# Patient Record
Sex: MALE | Race: BLACK OR AFRICAN AMERICAN | NOT HISPANIC OR LATINO | Employment: FULL TIME | URBAN - METROPOLITAN AREA
[De-identification: names, ages, dates, MRNs, and addresses within clinical notes are randomized per-mention and may not be internally consistent; named-entity substitution may affect disease eponyms.]

---

## 2019-11-17 ENCOUNTER — APPOINTMENT (OUTPATIENT)
Dept: CT IMAGING | Facility: HOSPITAL | Age: 49
End: 2019-11-17

## 2019-11-17 ENCOUNTER — HOSPITAL ENCOUNTER (EMERGENCY)
Facility: HOSPITAL | Age: 49
Discharge: HOME | End: 2019-11-17
Attending: EMERGENCY MEDICINE | Admitting: EMERGENCY MEDICINE

## 2019-11-17 VITALS
DIASTOLIC BLOOD PRESSURE: 86 MMHG | SYSTOLIC BLOOD PRESSURE: 148 MMHG | BODY MASS INDEX: 33.53 KG/M2 | HEART RATE: 80 BPM | RESPIRATION RATE: 16 BRPM | OXYGEN SATURATION: 99 % | TEMPERATURE: 98.6 F | HEIGHT: 76 IN | WEIGHT: 275.35 LBS

## 2019-11-17 DIAGNOSIS — M10.9 ACUTE GOUT OF RIGHT HAND, UNSPECIFIED CAUSE: ICD-10-CM

## 2019-11-17 DIAGNOSIS — R10.9 RIGHT FLANK PAIN: Primary | ICD-10-CM

## 2019-11-17 LAB
BILIRUB UR QL STRIP: NEGATIVE
CLARITY UR: CLEAR
COLOR UR: YELLOW
GLUCOSE UR STRIP-MCNC: NEGATIVE MG/DL
HGB UR QL STRIP.AUTO: NEGATIVE
KETONES UR QL STRIP: NEGATIVE
LEUKOCYTE ESTERASE UR QL STRIP.AUTO: NEGATIVE
NITRITE UR QL STRIP: NEGATIVE
PH UR STRIP.AUTO: 6 [PH] (ref 5–8)
PROT UR QL STRIP: NEGATIVE
SP GR UR STRIP: <=1.005 (ref 1–1.03)
UROBILINOGEN UR QL STRIP: NORMAL

## 2019-11-17 PROCEDURE — 74176 CT ABD & PELVIS W/O CONTRAST: CPT

## 2019-11-17 PROCEDURE — 81003 URINALYSIS AUTO W/O SCOPE: CPT | Performed by: EMERGENCY MEDICINE

## 2019-11-17 PROCEDURE — 99283 EMERGENCY DEPT VISIT LOW MDM: CPT

## 2019-11-17 RX ORDER — NAPROXEN SODIUM 220 MG
220 TABLET ORAL 2 TIMES DAILY PRN
COMMUNITY
End: 2020-12-07

## 2019-11-17 RX ORDER — PREDNISONE 20 MG/1
60 TABLET ORAL DAILY
Qty: 15 TABLET | Refills: 0 | Status: SHIPPED | OUTPATIENT
Start: 2019-11-17 | End: 2019-11-22

## 2019-11-17 NOTE — DISCHARGE INSTRUCTIONS
Follow-up with your primary doctor.  Return to the emergency room for any new or worsening symptoms or if you have any other questions or concerns.  Take medication as prescribed.

## 2019-11-17 NOTE — ED PROVIDER NOTES
Subjective   48-year-old male presents with right flank pain.  Patient states the pain has been intermittent over the past week.  Pain is described as moderate in intensity without radiation.  Patient states pain is worse with certain movements.  He denies any dysuria or hematuria.  He states he has had kidney stones in the past and this feels similar.  He denies any nausea or vomiting.  He also states he has a history of multiple gout flares.  He states he is having a gout flare in his right hand which has been present for a few days.        History provided by:  Patient      Review of Systems   Constitutional: Negative for fatigue and fever.   HENT: Negative for congestion and sore throat.    Eyes: Negative for pain and redness.   Respiratory: Negative for cough and shortness of breath.    Cardiovascular: Negative for chest pain and palpitations.   Gastrointestinal: Negative for abdominal pain, nausea and vomiting.   Genitourinary: Positive for flank pain. Negative for dysuria.   Musculoskeletal: Negative for neck pain.   Skin: Negative for rash.   Neurological: Negative for dizziness and headaches.   Psychiatric/Behavioral: Negative for behavioral problems and confusion.       Past Medical History:   Diagnosis Date   • Arthritis    • Gout    • Hypertension    • Kidney stone        No Known Allergies    Past Surgical History:   Procedure Laterality Date   • MEDIAL COLLATERAL LIGAMENT REPAIR, KNEE Left    • WRIST SURGERY Right        History reviewed. No pertinent family history.    Social History     Socioeconomic History   • Marital status:      Spouse name: Not on file   • Number of children: Not on file   • Years of education: Not on file   • Highest education level: Not on file   Tobacco Use   • Smoking status: Never Smoker   • Smokeless tobacco: Never Used   Substance and Sexual Activity   • Alcohol use: Yes     Alcohol/week: 12.6 oz     Types: 21 Cans of beer per week   • Drug use: No   • Sexual  "activity: Defer       BP (!) 174/107 (BP Location: Left arm, Patient Position: Sitting)   Pulse 75   Temp 98.1 °F (36.7 °C) (Oral)   Resp 16   Ht 193 cm (76\")   Wt 125 kg (275 lb 5.7 oz)   SpO2 99%   BMI 33.52 kg/m²       Objective   Physical Exam   Constitutional: He is oriented to person, place, and time. He appears well-developed and well-nourished.   HENT:   Head: Normocephalic and atraumatic.   Eyes: EOM are normal. Pupils are equal, round, and reactive to light.   Neck: Normal range of motion. Neck supple.   Cardiovascular: Normal rate, regular rhythm and normal heart sounds.   Pulmonary/Chest: Effort normal and breath sounds normal. No respiratory distress.   Abdominal: Soft. Bowel sounds are normal. There is no tenderness. There is no CVA tenderness.   Musculoskeletal:   There is some swelling and tenderness to the right hand mostly overlying the fourth and fifth MCP joints   Neurological: He is alert and oriented to person, place, and time.   Skin: Skin is warm and dry.   Nursing note and vitals reviewed.      Procedures           ED Course      Results for orders placed or performed during the hospital encounter of 11/17/19   Urinalysis With Culture If Indicated - Urine, Clean Catch   Result Value Ref Range    Color, UA Yellow Yellow, Straw    Appearance, UA Clear Clear    pH, UA 6.0 5.0 - 8.0    Specific Gravity, UA <=1.005 1.005 - 1.030    Glucose, UA Negative Negative    Ketones, UA Negative Negative    Bilirubin, UA Negative Negative    Blood, UA Negative Negative    Protein, UA Negative Negative    Leuk Esterase, UA Negative Negative    Nitrite, UA Negative Negative    Urobilinogen, UA 0.2 E.U./dL 0.2 - 1.0 E.U./dL     Ct Abdomen Pelvis Without Contrast    Result Date: 11/17/2019  No acute findings are seen. No renal stones are identified. No hydronephrosis. No obstructive uropathy is suggested.  Electronically Signed By-Dr. Douglas Pablo MD On:11/17/2019 2:41 PM This report was finalized on " [id number] by Dr. Douglas Pablo MD.                MDM   CT of the abdomen pelvis showed no acute abnormality.  Patient's right flank pain is likely due to a muscle strain.  Patient also has a gout flare in his right hand.  He states he has had this many times and it always improves with prednisone.  He will be given a prescription for prednisone and he is to follow-up with his primary doctor.      Final diagnoses:   Right flank pain   Acute gout of right hand, unspecified cause              Robb Lindsay MD  11/17/19 2725

## 2020-03-10 ENCOUNTER — APPOINTMENT (OUTPATIENT)
Dept: GENERAL RADIOLOGY | Facility: HOSPITAL | Age: 50
End: 2020-03-10

## 2020-03-10 ENCOUNTER — APPOINTMENT (OUTPATIENT)
Dept: MRI IMAGING | Facility: HOSPITAL | Age: 50
End: 2020-03-10

## 2020-03-10 ENCOUNTER — HOSPITAL ENCOUNTER (EMERGENCY)
Facility: HOSPITAL | Age: 50
Discharge: HOME | End: 2020-03-10
Attending: EMERGENCY MEDICINE | Admitting: EMERGENCY MEDICINE

## 2020-03-10 VITALS
TEMPERATURE: 97.5 F | HEIGHT: 76 IN | OXYGEN SATURATION: 100 % | SYSTOLIC BLOOD PRESSURE: 172 MMHG | WEIGHT: 266.98 LBS | DIASTOLIC BLOOD PRESSURE: 110 MMHG | BODY MASS INDEX: 32.51 KG/M2 | HEART RATE: 70 BPM | RESPIRATION RATE: 16 BRPM

## 2020-03-10 DIAGNOSIS — M54.41 ACUTE BILATERAL LOW BACK PAIN WITH RIGHT-SIDED SCIATICA: ICD-10-CM

## 2020-03-10 DIAGNOSIS — M51.36 LUMBAR DEGENERATIVE DISC DISEASE: Primary | ICD-10-CM

## 2020-03-10 PROCEDURE — 25010000002 HYDROMORPHONE PER 4 MG: Performed by: EMERGENCY MEDICINE

## 2020-03-10 PROCEDURE — 96372 THER/PROPH/DIAG INJ SC/IM: CPT

## 2020-03-10 PROCEDURE — 72148 MRI LUMBAR SPINE W/O DYE: CPT

## 2020-03-10 PROCEDURE — 63710000001 PREDNISONE PER 5 MG: Performed by: EMERGENCY MEDICINE

## 2020-03-10 PROCEDURE — 73502 X-RAY EXAM HIP UNI 2-3 VIEWS: CPT

## 2020-03-10 PROCEDURE — 99283 EMERGENCY DEPT VISIT LOW MDM: CPT

## 2020-03-10 PROCEDURE — 25010000002 ONDANSETRON PER 1 MG: Performed by: EMERGENCY MEDICINE

## 2020-03-10 PROCEDURE — 96374 THER/PROPH/DIAG INJ IV PUSH: CPT

## 2020-03-10 RX ORDER — CLONIDINE HYDROCHLORIDE 0.1 MG/1
0.1 TABLET ORAL DAILY
COMMUNITY

## 2020-03-10 RX ORDER — HYDROCODONE BITARTRATE AND ACETAMINOPHEN 7.5; 325 MG/1; MG/1
1 TABLET ORAL EVERY 6 HOURS PRN
Qty: 12 TABLET | Refills: 0 | Status: SHIPPED | OUTPATIENT
Start: 2020-03-10

## 2020-03-10 RX ORDER — TIZANIDINE HYDROCHLORIDE 4 MG/1
4 CAPSULE, GELATIN COATED ORAL 3 TIMES DAILY PRN
Qty: 12 CAPSULE | Refills: 0 | Status: SHIPPED | OUTPATIENT
Start: 2020-03-10 | End: 2020-12-07

## 2020-03-10 RX ORDER — METHYLPREDNISOLONE 4 MG/1
TABLET ORAL
Qty: 1 EACH | Refills: 0 | Status: SHIPPED | OUTPATIENT
Start: 2020-03-10 | End: 2020-12-16

## 2020-03-10 RX ORDER — ONDANSETRON 2 MG/ML
4 INJECTION INTRAMUSCULAR; INTRAVENOUS ONCE
Status: COMPLETED | OUTPATIENT
Start: 2020-03-10 | End: 2020-03-10

## 2020-03-10 RX ORDER — HYDROMORPHONE HCL 110MG/55ML
1 PATIENT CONTROLLED ANALGESIA SYRINGE INTRAVENOUS ONCE
Status: COMPLETED | OUTPATIENT
Start: 2020-03-10 | End: 2020-03-10

## 2020-03-10 RX ADMIN — ONDANSETRON 4 MG: 2 INJECTION INTRAMUSCULAR; INTRAVENOUS at 14:16

## 2020-03-10 RX ADMIN — HYDROMORPHONE HYDROCHLORIDE 1 MG: 2 INJECTION, SOLUTION INTRAMUSCULAR; INTRAVENOUS; SUBCUTANEOUS at 14:16

## 2020-03-10 RX ADMIN — PREDNISONE 60 MG: 10 TABLET ORAL at 14:16

## 2020-03-10 NOTE — ED PROVIDER NOTES
Subjective   49-year-old male complaining of sciatic type pain as well as severe pain in his lower back.  He reports no change in bowel or bladder habits.  He reports no acute trauma to the area.  He states that he has tried anti-inflammatory measures and activity restriction at home and has failed to improve.  He reports that he has numbness and pain rating down the posterior lateral aspect of his leg for at least a week.  He states that he had actually missed a couple of steps due to the amount of pain.  He denies dysuria or hematuria          Review of Systems    Past Medical History:   Diagnosis Date   • Arthritis    • Gout    • Hypertension    • Kidney stone        No Known Allergies    Past Surgical History:   Procedure Laterality Date   • MEDIAL COLLATERAL LIGAMENT REPAIR, KNEE Left    • WRIST SURGERY Right        No family history on file.    Social History     Socioeconomic History   • Marital status:      Spouse name: Not on file   • Number of children: Not on file   • Years of education: Not on file   • Highest education level: Not on file   Tobacco Use   • Smoking status: Never Smoker   • Smokeless tobacco: Never Used   Substance and Sexual Activity   • Alcohol use: Yes     Alcohol/week: 21.0 standard drinks     Types: 21 Cans of beer per week   • Drug use: No   • Sexual activity: Defer           Objective   Physical Exam  Alert [place] Coma Scale 15   HEENT: Pupils equal and reactive to light. Conjunctivae are not injected. normal tympanic membranes. Oropharynx and nares are normal.   Neck: Supple. Midline trachea. No JVD. No goiter.   Chest: Clear and equal breath sounds bilaterally regular rate and rhythm without murmur or rub.   Abdomen: Positive bowel sounds nontender nondistended. No rebound or peritoneal signs. No CVA tenderness.   Extremities no clubbing cyanosis or edema motor sensory exam is normal the full range of motion is intact  Back: There is extensive lumbar paraspinal tenderness.   There is right-sided SI joint area discomfort.  There is a positive straight leg raising test on the right   skin: Warm and dry, no rashes or petechia.   Lymphatic: No regional lymphadenopathy. No calf pain, swelling or Nereida's sign    Procedures           ED Course         Extended ER course secondary to delay in MR      Labs Reviewed - No data to display  Medications   predniSONE (DELTASONE) tablet 60 mg (60 mg Oral Given 3/10/20 1416)   HYDROmorphone (DILAUDID) injection 1 mg (1 mg Intramuscular Given 3/10/20 1416)   ondansetron (ZOFRAN) injection 4 mg (4 mg Intravenous Given 3/10/20 1416)     No radiology results for the last day                               MDM  Number of Diagnoses or Management Options     Amount and/or Complexity of Data Reviewed  Tests in the radiology section of CPT®: reviewed  Review and summarize past medical records: yes  Independent visualization of images, tracings, or specimens: yes    Risk of Complications, Morbidity, and/or Mortality  Presenting problems: high  Diagnostic procedures: high  Management options: high  General comments: Inspect was reviewed.  The patient was given a prescription for hydrocodone and Medrol Dosepak.  The patient will also have a prescription for tizanidine.  The patient will be referred to the [facility] spine center.  The patient was stable at discharge and vocalized understanding of discharge instructions and warning        Final diagnoses:   Lumbar degenerative disc disease   Acute bilateral low back pain with right-sided sciatica            Karlo Lofton MD  03/10/20 1748       Karlo Lofton MD  03/14/20 7524

## 2020-03-10 NOTE — DISCHARGE INSTRUCTIONS
Medication as directed  Rest, no work, next 24 hours  No heavy lifting bending stooping or pulling for the next 5 days  You can also use Tylenol or ibuprofen for discomfort  Follow-up with spine clinic

## 2020-03-11 ENCOUNTER — TELEPHONE (OUTPATIENT)
Dept: NEUROSURGERY | Facility: CLINIC | Age: 50
End: 2020-03-11

## 2020-03-11 NOTE — TELEPHONE ENCOUNTER
PATIENT WAS CALLING TO SCHEDULE FOR A FOLLOW UP FROM BEING IN THE EMERGENCY ROOM YESTERDAY. PER THE NOTES FROM ED HE WILL BE REFERRED TO THE SPINE CENTER. PLEASE SEE IF PATIENTS CHART NEEDS TO BE REVIEWED TO GET HIM SCHEDULED.     PATIENT CAN BE REACHED ANYTIME [phone]

## 2020-03-24 DIAGNOSIS — M47.27 SPONDYLOSIS OF LUMBOSACRAL SPINE WITH RADICULOPATHY: Primary | ICD-10-CM

## 2020-03-24 RX ORDER — GABAPENTIN 300 MG/1
300 CAPSULE ORAL NIGHTLY
Qty: 120 CAPSULE | Refills: 2 | Status: SHIPPED | OUTPATIENT
Start: 2020-03-24

## 2020-03-25 ENCOUNTER — TELEPHONE (OUTPATIENT)
Dept: NEUROSURGERY | Facility: CLINIC | Age: 50
End: 2020-03-25

## 2020-03-25 NOTE — TELEPHONE ENCOUNTER
----- Message from Thony Pickard MD sent at 3/24/2020  4:46 PM EDT -----  Regarding: Plan for Mr Luis  I spoke with the patient.  His leg pain is getting some better and he has spinal stenosis and neurogenic claudication.  I sent him in [employer] and gave him the web link to [employer] to the back exercise page.  I sent a referral to [facility] and if they need a note let them know to use the ER note and my documentation. He has severe degenerative disease and stenosis at the L3-S1 with L4-5 being the worse.  There is a right acute disc at L4-5.  Send them the MRI report.  I will see him in one month.  If the pain worsens he needs to get in sooner.

## 2020-12-07 ENCOUNTER — HOSPITAL ENCOUNTER (EMERGENCY)
Facility: HOSPITAL | Age: 50
Discharge: HOME | End: 2020-12-07
Admitting: EMERGENCY MEDICINE

## 2020-12-07 ENCOUNTER — APPOINTMENT (OUTPATIENT)
Dept: GENERAL RADIOLOGY | Facility: HOSPITAL | Age: 50
End: 2020-12-07

## 2020-12-07 VITALS
OXYGEN SATURATION: 97 % | SYSTOLIC BLOOD PRESSURE: 140 MMHG | HEIGHT: 76 IN | HEART RATE: 74 BPM | BODY MASS INDEX: 33.26 KG/M2 | WEIGHT: 273.15 LBS | RESPIRATION RATE: 16 BRPM | DIASTOLIC BLOOD PRESSURE: 70 MMHG | TEMPERATURE: 98 F

## 2020-12-07 DIAGNOSIS — M25.512 ACUTE PAIN OF LEFT SHOULDER: Primary | ICD-10-CM

## 2020-12-07 PROCEDURE — 73030 X-RAY EXAM OF SHOULDER: CPT

## 2020-12-07 PROCEDURE — 99283 EMERGENCY DEPT VISIT LOW MDM: CPT

## 2020-12-07 PROCEDURE — 96372 THER/PROPH/DIAG INJ SC/IM: CPT

## 2020-12-07 PROCEDURE — 25010000002 KETOROLAC TROMETHAMINE PER 15 MG: Performed by: NURSE PRACTITIONER

## 2020-12-07 RX ORDER — CYCLOBENZAPRINE HCL 10 MG
10 TABLET ORAL 3 TIMES DAILY PRN
Qty: 10 TABLET | Refills: 0 | Status: SHIPPED | OUTPATIENT
Start: 2020-12-07 | End: 2021-01-23

## 2020-12-07 RX ORDER — KETOROLAC TROMETHAMINE 30 MG/ML
30 INJECTION, SOLUTION INTRAMUSCULAR; INTRAVENOUS ONCE
Status: COMPLETED | OUTPATIENT
Start: 2020-12-07 | End: 2020-12-07

## 2020-12-07 RX ORDER — METHOCARBAMOL 500 MG/1
500 TABLET, FILM COATED ORAL 4 TIMES DAILY
COMMUNITY

## 2020-12-07 RX ORDER — CYCLOBENZAPRINE HCL 10 MG
10 TABLET ORAL 3 TIMES DAILY PRN
Qty: 10 TABLET | Refills: 0 | Status: SHIPPED | OUTPATIENT
Start: 2020-12-07 | End: 2020-12-07 | Stop reason: SDUPTHER

## 2020-12-07 RX ADMIN — KETOROLAC TROMETHAMINE 30 MG: 30 INJECTION, SOLUTION INTRAMUSCULAR at 15:52

## 2020-12-07 NOTE — ED PROVIDER NOTES
Subjective   Patient presents with:  Arm Pain    Onset: 3 weeks  Provocative Factors: Worsened with movement of the left arm  Palliative Factors: Improved with rest in certain positions  Quality: Aching  Region: Left shoulder  Severity: Moderate  Timing: Waxes and wanes    Patient is a 50-year-old who presents emergency department with complaint of left shoulder pain for 3 weeks.  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  Patient also reports that he has a history of an injury to the shoulder several years ago, with a fracture.  He denies any new injury or trauma to the area. No Recent falls.  Denies any chest pain or shortness of breath.          Review of Systems   Constitutional: Negative for chills and fever.   Respiratory: Negative for cough, chest tightness and shortness of breath.    Cardiovascular: Negative for chest pain, palpitations and leg swelling.   Musculoskeletal: Negative for back pain and neck pain.        Left shoulder pain   Skin: Negative for rash.       Past Medical History:   Diagnosis Date   • Arthritis    • Gout    • Hypertension    • Kidney stone        No Known Allergies    Past Surgical History:   Procedure Laterality Date   • MEDIAL COLLATERAL LIGAMENT REPAIR, KNEE Left    • WRIST SURGERY Right        History reviewed. No pertinent family history.     Social History     Socioeconomic History   • Marital status:      Spouse name: Not on file   • Number of children: Not on file   • Years of education: Not on file   • Highest education level: Not on file   Tobacco Use   • Smoking status: Never Smoker   • Smokeless tobacco: Never Used   Substance and Sexual Activity   • Alcohol use: Yes     Alcohol/week: 21.0 standard drinks     Types: 21 Cans of beer per week   • Drug use: No   • Sexual activity: Defer           Objective   Physical Exam  Vitals signs and nursing note reviewed.   Constitutional:       Appearance: Normal appearance.   HENT:       "Head: Normocephalic and atraumatic.      Nose: Nose normal.      Mouth/Throat:      Mouth: Mucous membranes are moist.      Pharynx: Oropharynx is clear.   Eyes:      Extraocular Movements: Extraocular movements intact.      Conjunctiva/sclera: Conjunctivae normal.      Pupils: Pupils are equal, round, and reactive to light.   Neck:      Musculoskeletal: Normal range of motion and neck supple.   Cardiovascular:      Rate and Rhythm: Normal rate and regular rhythm.      Heart sounds: Normal heart sounds. No murmur. No friction rub. No gallop.    Pulmonary:      Effort: Pulmonary effort is normal.      Breath sounds: Normal breath sounds.   Abdominal:      General: Bowel sounds are normal.      Palpations: Abdomen is soft.   Musculoskeletal:      Left shoulder: He exhibits tenderness and pain. He exhibits normal range of motion, no bony tenderness, no swelling, no effusion, no crepitus, no deformity, no laceration, no spasm, normal pulse and normal strength.      Comments: Pain with range of motion of the left shoulder with abduction, extension, reproducing the patient's subjective complaint of pain.  Some mild tenderness noted to the anterior and posterior shoulder with palpation.  Radial brachial pulses 2+.  Cap refill brisk   Skin:     General: Skin is warm and dry.      Capillary Refill: Capillary refill takes less than 2 seconds.   Neurological:      Mental Status: He is alert and oriented to person, place, and time.   Psychiatric:         Mood and Affect: Mood normal.         Behavior: Behavior normal.         Procedures           ED Course  Appropriate PPE was worn during the duration of the care for this patient while in the emergency department per [facility] guidelines      /70 (Patient Position: Sitting)   Pulse 74   Temp 98 °F (36.7 °C) (Oral)   Resp 16   Ht 193 cm (76\")   Wt 124 kg (273 lb 2.4 oz)   SpO2 97%   BMI 33.25 kg/m²   Labs Reviewed - No data to display  Medications "   ketorolac (TORADOL) injection 30 mg (30 mg Intramuscular Given 12/7/20 1552)     Xr Shoulder 2+ View Left    Result Date: 12/7/2020  1. There is deformity of the distal clavicle and the acromion which looks long-standing. Some this could be due to remote injury. There is moderate degenerative change of AC joint. 2 mild narrowing of the glenohumeral joint.  Electronically Signed By-Amber Singh MD On:12/7/2020 4:38 PM This report was finalized on [id number] by  Amber Singh MD.                                         MDM  Number of Diagnoses or Management Options  Acute pain of left shoulder:   Diagnosis management comments: Patient is a 50-year-old gentleman who presents emergency department complaining of left shoulder pain for 3 weeks.  Patient denies any direct trauma or injury to the area, he does report he has had a previous injury to that shoulder.  His pain is grossly reproducible with movement, he has some tenderness with palpation.  Pain appears to be musculoskeletal in nature.  I have advised him to follow-up with his orthopedic provider, as well as a primary care provider.  Of note his blood pressure was elevated today, and he was given information for primary care follow-up.           Amount and/or Complexity of Data Reviewed  Tests in the radiology section of CPT®: reviewed    Patient Progress  Patient progress: stable      Final diagnoses:   Acute pain of left shoulder            Mariluz Abdi, APRN  12/07/20 2122

## 2020-12-07 NOTE — ED NOTES
Pt reports let shoulder pain in left shoulder. Pt denies any injury but believes it is his rotor cuff.      Yojana Bangura, RN  12/07/20 0203

## 2020-12-07 NOTE — DISCHARGE INSTRUCTIONS
Please follow up with your primary care provider: if you do not have one, one has been provided above and you may call for an appointment for primary care.  Return to the ED for new or worsening symptoms  Please follow-up with orthopedics, call for an appointment

## 2020-12-16 ENCOUNTER — OFFICE VISIT (OUTPATIENT)
Dept: ORTHOPEDIC SURGERY | Facility: CLINIC | Age: 50
End: 2020-12-16

## 2020-12-16 VITALS
BODY MASS INDEX: 33.73 KG/M2 | HEIGHT: 76 IN | WEIGHT: 277 LBS | SYSTOLIC BLOOD PRESSURE: 192 MMHG | HEART RATE: 81 BPM | DIASTOLIC BLOOD PRESSURE: 128 MMHG

## 2020-12-16 DIAGNOSIS — M25.512 ACUTE PAIN OF LEFT SHOULDER: Primary | ICD-10-CM

## 2020-12-16 DIAGNOSIS — I10 HYPERTENSION, UNSPECIFIED TYPE: ICD-10-CM

## 2020-12-16 PROCEDURE — 99203 OFFICE O/P NEW LOW 30 MIN: CPT | Performed by: ORTHOPAEDIC SURGERY

## 2020-12-16 NOTE — PATIENT INSTRUCTIONS
MRI follow-up instructions    Today at your office visit, Dr. Mata recommended an MRI (magnetic resonance imaging) to evaluate your joint pain.  This requires a precertification process, which our office will do, and then we will contact you when it is approved and go over scheduling options.  We typically recommend these to be performed at [facility] or [facility].  If for some reason it is performed elsewhere please arrange to have that facility give you a disc with your images on it so Dr. Mata can review it at your follow-up visit.    When checking out today we recommend making an appointment to go over your results in approximately two weeks.  If your MRI is done sooner than that we would be happy to schedule you sooner to go over your results, just contact us at [phone] or through the [employer] portal to let us know your MRI is completed.  Seeing you in person for the results gives us the best opportunity to look at your images together and explain the diagnosis and treatment options to best help you.

## 2020-12-16 NOTE — PROGRESS NOTES
"     Patient ID: Kevin Luis is a 50 y.o. male.    Chief Complaint:    Chief Complaint   Patient presents with   • Left Shoulder - Consult       HPI:  Mr. Luis is a 50-year-old gentleman here with about 3 to 4 weeks of acute shoulder pain.  He has had shoulder pain like this before which always occurs with heavy lifting which is now increased with his job.  Pain is dull and a 6/10 and he has difficulty lifting his arm overhead and sleeping at night.  He is taking pain medication and used ice with activity restriction with little relief  Past Medical History:   Diagnosis Date   • Arthritis    • Gout    • Hypertension    • Kidney stone        Past Surgical History:   Procedure Laterality Date   • MEDIAL COLLATERAL LIGAMENT REPAIR, KNEE Left    • WRIST SURGERY Right        History reviewed. No pertinent family history.       Social History     Occupational History   • Not on file   Tobacco Use   • Smoking status: Never Smoker   • Smokeless tobacco: Never Used   Substance and Sexual Activity   • Alcohol use: Yes     Alcohol/week: 21.0 standard drinks     Types: 21 Cans of beer per week   • Drug use: No   • Sexual activity: Defer      Review of Systems   Cardiovascular: Negative for chest pain.   Musculoskeletal: Positive for arthralgias.       Objective:    BP (!) 192/128   Pulse 81   Ht 193 cm (76\")   Wt 126 kg (277 lb)   BMI 33.72 kg/m²     Physical Examination:  He is a pleasant male in no distress. He is alert and oriented x3 and appears his stated age.  Left shoulder demonstrates slight supraspinatus atrophy with no scars.  There is mild pain over the bicep groove.  Passive elevation 170 degrees abduction 130 degrees external rotation 40 degrees internal rotation S1.  He has pain and weakness on Speed, [place], supraspinatus testing.  Belly press and liftoff are 4/5.Sensory and motor exam are intact all distributions. Radial pulse is palpable and capillary refill is less than two seconds to all " digits      Imaging:  X-rays demonstrate well-maintained joint space with spurring of the greater tuberosity and bony impingement    Assessment:  Left shoulder pain rotator cuff this possible tear  Significant hypertension    Plan:  Recommend MRI to evaluate his shoulder.  Discussed the elevated blood pressure and its dangers today as well as warning signs of stroke or otherwise hypertensive crisis.  Will arrange for primary care referral ASAP      Procedures         Disclaimer: Please note that areas of this note were completed with computer voice recognition software.  Quite often unanticipated grammatical, syntax, homophones, and other interpretive errors are inadvertently transcribed by the computer software. Please excuse any errors that have escaped final proofreading.

## 2021-01-05 ENCOUNTER — HOSPITAL ENCOUNTER (OUTPATIENT)
Dept: MRI IMAGING | Facility: HOSPITAL | Age: 51
Discharge: HOME | End: 2021-01-05

## 2021-01-05 DIAGNOSIS — M25.512 ACUTE PAIN OF LEFT SHOULDER: ICD-10-CM

## 2021-01-05 PROCEDURE — 73221 MRI JOINT UPR EXTREM W/O DYE: CPT

## 2021-01-13 ENCOUNTER — OFFICE VISIT (OUTPATIENT)
Dept: ORTHOPEDIC SURGERY | Facility: CLINIC | Age: 51
End: 2021-01-13

## 2021-01-13 VITALS
WEIGHT: 277 LBS | HEART RATE: 105 BPM | BODY MASS INDEX: 33.73 KG/M2 | DIASTOLIC BLOOD PRESSURE: 105 MMHG | HEIGHT: 76 IN | SYSTOLIC BLOOD PRESSURE: 186 MMHG

## 2021-01-13 DIAGNOSIS — M75.122 COMPLETE TEAR OF LEFT ROTATOR CUFF, UNSPECIFIED WHETHER TRAUMATIC: Primary | ICD-10-CM

## 2021-01-13 PROCEDURE — 20610 DRAIN/INJ JOINT/BURSA W/O US: CPT | Performed by: ORTHOPAEDIC SURGERY

## 2021-01-13 PROCEDURE — 99213 OFFICE O/P EST LOW 20 MIN: CPT | Performed by: ORTHOPAEDIC SURGERY

## 2021-01-13 RX ORDER — TRIAMCINOLONE ACETONIDE 40 MG/ML
40 INJECTION, SUSPENSION INTRA-ARTICULAR; INTRAMUSCULAR ONCE
Status: COMPLETED | OUTPATIENT
Start: 2021-01-13 | End: 2021-01-13

## 2021-01-13 RX ADMIN — TRIAMCINOLONE ACETONIDE 40 MG: 40 INJECTION, SUSPENSION INTRA-ARTICULAR; INTRAMUSCULAR at 09:53

## 2021-01-13 NOTE — PROGRESS NOTES
"     Patient ID: Kevin Luis is a 50 y.o. male.  Left shoulder pain  Mr. Luis follows up with continued left shoulder pain after his MRI    Review of Systems:  Left shoulder pain      Objective:    BP (!) 186/105 (BP Location: Left arm, Patient Position: Sitting, Cuff Size: Large Adult)   Pulse 105   Ht 193 cm (76\")   Wt 126 kg (277 lb)   BMI 33.72 kg/m²     Physical Examination:   He is a pleasant male in no distress. He is alert and oriented x3 and appears his stated age.  Left shoulder demonstrates slight supraspinatus atrophy with no scars.  There is mild pain over the bicep groove.  Passive elevation 170 degrees abduction 130 degrees external rotation 40 degrees internal rotation S1.  He has pain and weakness on Speed, [place], supraspinatus testing.  Belly press and liftoff are 4/5.Sensory and motor exam are intact all distributions. 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  Like to begin with conservative management,I recommend injection after todays evaluation.  Risks and benefits were discussed. Under sterile technique and written consent I injected 40mg of Kenalog and 1cc of 1% Lidocaine plain into the subacromial space. It was well tolerated.      Procedures          Disclaimer: Please note that areas of this note were completed with computer voice recognition software.  Quite often unanticipated grammatical, syntax, homophones, and other interpretive errors are inadvertently transcribed by the computer software. Please excuse any errors that have escaped final proofreading.  "

## 2021-01-23 ENCOUNTER — APPOINTMENT (OUTPATIENT)
Dept: CT IMAGING | Facility: HOSPITAL | Age: 51
End: 2021-01-23

## 2021-01-23 ENCOUNTER — HOSPITAL ENCOUNTER (EMERGENCY)
Facility: HOSPITAL | Age: 51
Discharge: HOME | End: 2021-01-23
Admitting: EMERGENCY MEDICINE

## 2021-01-23 VITALS
RESPIRATION RATE: 16 BRPM | HEART RATE: 78 BPM | DIASTOLIC BLOOD PRESSURE: 100 MMHG | SYSTOLIC BLOOD PRESSURE: 182 MMHG | BODY MASS INDEX: 33.07 KG/M2 | TEMPERATURE: 98 F | HEIGHT: 76 IN | OXYGEN SATURATION: 97 % | WEIGHT: 271.61 LBS

## 2021-01-23 DIAGNOSIS — M19.042 ARTHRITIS OF LEFT HAND: ICD-10-CM

## 2021-01-23 DIAGNOSIS — S16.1XXA STRAIN OF NECK MUSCLE, INITIAL ENCOUNTER: Primary | ICD-10-CM

## 2021-01-23 PROCEDURE — 72125 CT NECK SPINE W/O DYE: CPT

## 2021-01-23 PROCEDURE — 96372 THER/PROPH/DIAG INJ SC/IM: CPT

## 2021-01-23 PROCEDURE — 25010000002 KETOROLAC TROMETHAMINE PER 15 MG: Performed by: NURSE PRACTITIONER

## 2021-01-23 PROCEDURE — 70450 CT HEAD/BRAIN W/O DYE: CPT

## 2021-01-23 PROCEDURE — 99283 EMERGENCY DEPT VISIT LOW MDM: CPT

## 2021-01-23 RX ORDER — KETOROLAC TROMETHAMINE 30 MG/ML
30 INJECTION, SOLUTION INTRAMUSCULAR; INTRAVENOUS ONCE
Status: COMPLETED | OUTPATIENT
Start: 2021-01-23 | End: 2021-01-23

## 2021-01-23 RX ORDER — LIDOCAINE 50 MG/G
1 PATCH TOPICAL EVERY 24 HOURS
Qty: 6 EACH | Refills: 0 | Status: SHIPPED | OUTPATIENT
Start: 2021-01-23

## 2021-01-23 RX ORDER — LIDOCAINE 50 MG/G
1 PATCH TOPICAL ONCE
Status: DISCONTINUED | OUTPATIENT
Start: 2021-01-23 | End: 2021-01-23 | Stop reason: HOSPADM

## 2021-01-23 RX ORDER — HYDROCODONE BITARTRATE AND ACETAMINOPHEN 7.5; 325 MG/1; MG/1
1 TABLET ORAL EVERY 4 HOURS PRN
Qty: 6 TABLET | Refills: 0 | Status: SHIPPED | OUTPATIENT
Start: 2021-01-23

## 2021-01-23 RX ORDER — CYCLOBENZAPRINE HCL 10 MG
10 TABLET ORAL 3 TIMES DAILY PRN
Qty: 15 TABLET | Refills: 0 | Status: SHIPPED | OUTPATIENT
Start: 2021-01-23

## 2021-01-23 RX ORDER — CYCLOBENZAPRINE HCL 10 MG
10 TABLET ORAL ONCE
Status: COMPLETED | OUTPATIENT
Start: 2021-01-23 | End: 2021-01-23

## 2021-01-23 RX ORDER — HYDROCODONE BITARTRATE AND ACETAMINOPHEN 10; 325 MG/1; MG/1
1 TABLET ORAL EVERY 6 HOURS PRN
Status: DISCONTINUED | OUTPATIENT
Start: 2021-01-23 | End: 2021-01-23 | Stop reason: HOSPADM

## 2021-01-23 RX ORDER — METHYLPREDNISOLONE 4 MG/1
TABLET ORAL
Qty: 21 TABLET | Refills: 0 | Status: SHIPPED | OUTPATIENT
Start: 2021-01-23

## 2021-01-23 RX ADMIN — LIDOCAINE 1 PATCH: 50 PATCH TOPICAL at 09:27

## 2021-01-23 RX ADMIN — CYCLOBENZAPRINE HYDROCHLORIDE 10 MG: 10 TABLET, FILM COATED ORAL at 09:27

## 2021-01-23 RX ADMIN — KETOROLAC TROMETHAMINE 30 MG: 30 INJECTION, SOLUTION INTRAMUSCULAR at 09:28

## 2021-01-23 RX ADMIN — HYDROCODONE BITARTRATE AND ACETAMINOPHEN 1 TABLET: 10; 325 TABLET ORAL at 09:27

## 2021-01-23 NOTE — DISCHARGE INSTRUCTIONS
Take medication as prescribed.  Rest.  No heavy lifting pulling or straining next 3 to 5 days.  Monitor and record your blood pressures.  Follow-up with your primary care provider as scheduled on Monday.  Return for new or worsening symptoms.   emery

## 2021-01-23 NOTE — ED NOTES
Pt states has appt in 2 days with pain management and PCP r/t pain and elevated b/p.     Jennifer Causey RN  01/23/21 0906

## 2021-01-23 NOTE — ED PROVIDER NOTES
Subjective   Patient is a 50-year-old -American male with history of arthritis gout and hypertension presents today with complaints of pain in the right side of his neck and right shoulder.  He states that started 3 days ago but is progressively worsened.  He states 3 days ago he was at work and was struck in the head with a swinging door.  He states the door actually struck the bill of his hat but bent his neck backwards.  He denies LOC.  He complains of pain since then that has progressively worsened over the last few days.  He denies any numbness tingling or weakness in either upper extremity.  He does report a mild generalized headache without dizziness or visual changes.  He denies any speech difficulty.  He denies any nausea vomiting.  He denies any blood thinner use.  His second complaint is that of pain redness and swelling to the left thumb and index finger.  He states he has a history of gout and feels that he is having a flareup as his symptoms are consistent with that.  States he takes no regular medications for his gout and states that he is scheduled to see primary care and pain management in 2 days for the first visit.          Review of Systems   Constitutional: Negative for chills and fever.   HENT: Negative for congestion and sore throat.    Eyes: Negative for photophobia and visual disturbance.   Respiratory: Negative for shortness of breath.    Cardiovascular: Negative for chest pain.   Gastrointestinal: Negative for abdominal pain, nausea and vomiting.   Musculoskeletal: Positive for neck pain. Negative for back pain.        Neck pain right shoulder pain, pain redness and swelling to left hand.   Neurological: Positive for headaches. Negative for dizziness, syncope, speech difficulty, weakness, light-headedness and numbness.       Past Medical History:   Diagnosis Date   • Arthritis    • Gout    • Hypertension    • Kidney stone        No Known Allergies    Past Surgical History:    Procedure Laterality Date   • MEDIAL COLLATERAL LIGAMENT REPAIR, KNEE Left    • WRIST SURGERY Right        History reviewed. No pertinent family history.    Social History     Socioeconomic History   • Marital status:      Spouse name: Not on file   • Number of children: Not on file   • Years of education: Not on file   • Highest education level: Not on file   Tobacco Use   • Smoking status: Never Smoker   • Smokeless tobacco: Never Used   Substance and Sexual Activity   • Alcohol use: Yes     Alcohol/week: 21.0 standard drinks     Types: 21 Cans of beer per week   • Drug use: No   • Sexual activity: Defer           Objective   Physical Exam  Vital signs and triage nurse note reviewed.  Constitutional: Awake, alert; well-developed and well-nourished. No acute distress is noted.  HEENT: Normocephalic; pupils are PERRL with intact EOM; oropharynx is pink and moist without exudate or erythema.  No drooling or pooling of oral secretions.  Neck: Supple, full range of motion elicits pain.  There is mild midline tenderness without crepitus or step-off noted.  There is right paraspinal and trapezius muscle tenderness without redness swelling or crepitus; no cervical lymphadenopathy. Normal phonation.  Cardiovascular: Regular rate and rhythm, normal S1-S2.  No murmur noted.  Pulmonary: Respiratory effort regular nonlabored, breath sounds clear to auscultation all fields.  Abdomen: Soft, nontender, nondistended with normoactive bowel sounds; no rebound or guarding.  Musculoskeletal: Independent range of motion of all extremities.  There is tenderness, mild erythema and swelling with chronic deformities noted of the left thumb and index finger particularly over the MCP joints.  There is no streaking noted.  No open wounds.  Good range of motion.  Distal neurovascular motor intact.  Neuro: Alert oriented x3, speech is clear and appropriate, GCS 15.  No focal sensorimotor deficits noted. No facial asymmetry. Muscle  strength 5/5 bilateral.  Sensation intact bilaterally. DTRs are 2+ bilaterally.  Cranial nerves 2-12 grossly intact. Normal coordination.  No gaze, deviation, or nystagmus.  Follows commands.  Skin: Flesh tone, warm, dry, intact; no erythematous or petechial rash or lesion.    Procedures           ED Course      Labs Reviewed - No data to display  Ct Head Without Contrast    Result Date: 1/23/2021  1.No definite CT findings of acute intracranial abnormality. 2.Probable senescent calcifications in the basal ganglia and right caudate head. 3.Paranasal sinus mucosal thickening.  Electronically Signed By-Will Jarrett MD On:1/23/2021 10:47 AM This report was finalized on [id number] by  Will Jarrett MD.    Ct Cervical Spine Without Contrast    Result Date: 1/23/2021  1.No definitive CT findings of acute cervical spine abnormality. 2.Degenerative changes with at least moderate foraminal and canal narrowing in the lower cervical spine.  Electronically Signed By-Will Jarrett MD On:1/23/2021 11:09 AM This report was finalized on [id number] by  Will Jarrett MD.    Medications   lidocaine (LIDODERM) 5 % 1 patch (1 patch Transdermal Medication Applied 1/23/21 0927)   HYDROcodone-acetaminophen (NORCO)  MG per tablet 1 tablet (1 tablet Oral Given 1/23/21 0927)   ketorolac (TORADOL) injection 30 mg (30 mg Intramuscular Given 1/23/21 0928)   cyclobenzaprine (FLEXERIL) tablet 10 mg (10 mg Oral Given 1/23/21 0927)                                          MDM  Number of Diagnoses or Management Options  Arthritis of left hand:   Strain of neck muscle, initial encounter:   Diagnosis management comments: Comorbidities: Hypertension, arthritis, gout  Differentials: Gout, arthritis, muscle strain or spasm, fracture, ICH;this list is not all inclusive and does not constitute the entirety of considered causes  Discussion with provider:  Radiology interpretation: X-rays reviewed by me and interpreted by radiologist: As  above  Lab interpretation: Labs viewed by me significant for: Not warranted    Patient had CT of the head and C-spine obtained.  He was given Norco for pain as well as Toradol Flexeril and a Lidoderm patch.    Patient's work-up shows no acute abnormality.  He reports his symptoms have somewhat improved after ER therapy.  He is remained neurologically stable.  He states that he is currently out of this hydrocodone but will see his primary care provider in 2 days to have this refilled.    Diagnosis and treatment plan discussed with patient.  Patient agreeable to plan.   I discussed findings with patient who voices understanding of discharge instructions, signs and symptoms requiring return to ED; discharged improved and in stable condition with follow up for re-evaluation.           Amount and/or Complexity of Data Reviewed  Tests in the radiology section of CPT®: ordered and reviewed    Patient Progress  Patient progress: stable      Final diagnoses:   Strain of neck muscle, initial encounter   Arthritis of left hand            Chana Duncan, JANNA  01/23/21 1141

## 2021-01-23 NOTE — ED NOTES
"Pt c/o right neck pain radiating into right shoulder with neck and head pressure x2 days s/p being struck with door at work; c/o left hand pain, states \"I'm having a gout flare-up' x3 wks with Hx gout.     Jennifer Causey, RN  01/23/21 0917    "